# Patient Record
Sex: FEMALE | Race: BLACK OR AFRICAN AMERICAN | Employment: FULL TIME | URBAN - METROPOLITAN AREA
[De-identification: names, ages, dates, MRNs, and addresses within clinical notes are randomized per-mention and may not be internally consistent; named-entity substitution may affect disease eponyms.]

---

## 2017-01-23 ENCOUNTER — HOSPITAL ENCOUNTER (EMERGENCY)
Age: 37
Discharge: HOME | End: 2017-01-23
Attending: FAMILY MEDICINE

## 2017-01-23 VITALS
HEIGHT: 67 IN | SYSTOLIC BLOOD PRESSURE: 178 MMHG | TEMPERATURE: 100.6 F | BODY MASS INDEX: 44.23 KG/M2 | DIASTOLIC BLOOD PRESSURE: 79 MMHG | OXYGEN SATURATION: 99 % | WEIGHT: 281.8 LBS | RESPIRATION RATE: 18 BRPM | HEART RATE: 101 BPM

## 2017-01-23 DIAGNOSIS — J20.9 ACUTE BRONCHITIS, UNSPECIFIED ORGANISM: Primary | ICD-10-CM

## 2017-01-23 LAB
INFLUENZA A AG (POC): NEGATIVE
INFLUENZA AG (POC) NEGATIVE CTRL.: NEGATIVE
INFLUENZA AG (POC) POSITIVE CTRL.: POSITIVE
INFLUENZA B AG (POC): NEGATIVE
LOT NUMBER POC: NORMAL

## 2017-01-23 RX ORDER — AZITHROMYCIN 250 MG/1
TABLET, FILM COATED ORAL
Qty: 6 TAB | Refills: 0 | Status: SHIPPED | OUTPATIENT
Start: 2017-01-23 | End: 2017-02-18

## 2017-01-23 RX ORDER — METHYLPREDNISOLONE 4 MG/1
TABLET ORAL
Qty: 1 DOSE PACK | Refills: 0 | Status: SHIPPED | OUTPATIENT
Start: 2017-01-23 | End: 2017-02-18

## 2017-01-23 RX ORDER — BENZONATATE 100 MG/1
100 CAPSULE ORAL
Qty: 30 CAP | Refills: 0 | Status: SHIPPED | OUTPATIENT
Start: 2017-01-23 | End: 2017-01-30

## 2017-01-23 NOTE — UC PROVIDER NOTE
Patient is a 40 y.o. female presenting with cough, fever, ear pain, and sore throat. The history is provided by the patient. Cough   This is a new problem. The current episode started yesterday. The problem occurs constantly. The problem has been gradually worsening. The cough is non-productive. There has been no fever. Associated symptoms include ear pain and sore throat. Pertinent negatives include no shortness of breath, no wheezing, no nausea and no vomiting. Her past medical history is significant for asthma. Her past medical history does not include bronchitis, pneumonia, bronchiectasis, COPD, cancer, heart failure or CHF. Fever    Associated symptoms include sore throat and cough. Pertinent negatives include no vomiting and no shortness of breath. Ear Pain    Associated symptoms include sore throat and cough. Pertinent negatives include no vomiting. Sore Throat    Associated symptoms include ear pain and cough. Pertinent negatives include no vomiting, no shortness of breath and no stridor.         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    Hx orthopaedic           Family History   Problem Relation Age of Onset    Diabetes Mother     Diabetes Father     Diabetes Maternal Aunt     Diabetes Maternal Uncle     Diabetes Maternal Grandmother     Heart Disease Maternal Grandmother     Stroke Maternal Grandmother     Diabetes Maternal Grandfather     Heart Disease Maternal Grandfather     Stroke Maternal Grandfather     Diabetes Paternal Grandmother     Diabetes Paternal Grandfather         Social History     Social History    Marital status: SINGLE     Spouse name: N/A    Number of children: N/A    Years of education: N/A     Occupational History    Not on file. Social History Main Topics    Smoking status: Never Smoker    Smokeless tobacco: Never Used    Alcohol use No    Drug use: No    Sexual activity: Yes     Birth control/ protection: None     Other Topics Concern    Not on file     Social History Narrative    Lives in [place] alone. No kids. Works in [employer] at [employer]. Likes to [employer] and travel. ALLERGIES: Pcn [penicillins]    Review of Systems   Constitutional: Positive for fever. HENT: Positive for ear pain and sore throat. Respiratory: Positive for cough. Negative for shortness of breath, wheezing and stridor. Gastrointestinal: Negative for nausea and vomiting. Genitourinary: Negative for dysuria. Musculoskeletal: Negative for back pain. Vitals:    01/23/17 1718   BP: 178/79   Pulse: (!) 101   Resp: 18   Temp: (!) 100.6 °F (38.1 °C)   SpO2: 99%   Weight: 127.8 kg (281 lb 12.8 oz)   Height: 5' 7\" (1.702 m)       Physical Exam   Constitutional: She is oriented to person, place, and time. No distress. HENT:   Mouth/Throat: No oropharyngeal exudate. Eyes: Right eye exhibits no discharge. Left eye exhibits no discharge. No scleral icterus. Neck: No JVD present. No tracheal deviation present. No thyromegaly present. Cardiovascular: Regular rhythm. Exam reveals no gallop. No murmur heard. Pulmonary/Chest: No respiratory distress. She has wheezes. She has no rales. Abdominal: She exhibits no distension and no mass. There is no tenderness. There is no rebound and no guarding. Musculoskeletal: She exhibits no edema or tenderness. Neurological: She is alert and oriented to person, place, and time. No cranial nerve deficit. Coordination normal.   Skin: No rash noted. She is not diaphoretic. No erythema. No pallor. Psychiatric: She has a normal mood and affect.  Her behavior is normal. Judgment and thought content normal. MDM    Procedures

## 2017-01-23 NOTE — DISCHARGE INSTRUCTIONS
Bronchitis: Care Instructions  Your Care Instructions    Bronchitis is inflammation of the bronchial tubes, which carry air to the lungs. The tubes swell and produce mucus, or phlegm. The mucus and inflamed bronchial tubes make you cough. You may have trouble breathing. Most cases of bronchitis are caused by viruses like those that cause colds. Antibiotics usually do not help and they may be harmful. Bronchitis usually develops rapidly and lasts about 2 to 3 weeks in otherwise healthy people. Follow-up care is a key part of your treatment and safety. Be sure to make and go to all appointments, and call your doctor if you are having problems. It's also a good idea to know your test results and keep a list of the medicines you take. How can you care for yourself at home? · Take all medicines exactly as prescribed. Call your doctor if you think you are having a problem with your medicine. · Get some extra rest.  · Take an over-the-counter pain medicine, such as acetaminophen (Tylenol), ibuprofen (Advil, Motrin), or naproxen (Aleve) to reduce fever and relieve body aches. Read and follow all instructions on the label. · Do not take two or more pain medicines at the same time unless the doctor told you to. Many pain medicines have acetaminophen, which is Tylenol. Too much acetaminophen (Tylenol) can be harmful. · Take an over-the-counter cough medicine that contains dextromethorphan to help quiet a dry, hacking cough so that you can sleep. Avoid cough medicines that have more than one active ingredient. Read and follow all instructions on the label. · Breathe moist air from a humidifier, hot shower, or sink filled with hot water. The heat and moisture will thin mucus so you can cough it out. · Do not smoke. Smoking can make bronchitis worse. If you need help quitting, talk to your doctor about stop-smoking programs and medicines. These can increase your chances of quitting for good.   When should you call for help? Call 911 anytime you think you may need emergency care. For example, call if:  · You have severe trouble breathing. Call your doctor now or seek immediate medical care if:  · You have new or worse trouble breathing. · You cough up dark brown or bloody mucus (sputum). · You have a new or higher fever. · You have a new rash. Watch closely for changes in your health, and be sure to contact your doctor if:  · You cough more deeply or more often, especially if you notice more mucus or a change in the color of your mucus. · You are not getting better as expected. Where can you learn more? Go to http://kayce-nidia.info/. Enter H333 in the search box to learn more about \"Bronchitis: Care Instructions. \"  Current as of: May 23, 2016  Content Version: 11.1  © 3731-0678 [employer]. Care instructions adapted under license by [employer] (which disclaims liability or warranty for this information). If you have questions about a medical condition or this instruction, always ask your healthcare professional. [address] any warranty or liability for your use of this information. Prednisolone (By injection)   Prednisolone (pred-NIS-oh-lone)  Treats many diseases and conditions, especially problems related to inflammation. This medicine is a steroid. This product is no longer available in the [place] but may be available in other countries. Brand Name(s):Cotolone   There may be other brand names for this medicine. When This Medicine Should Not Be Used: This medicine is not right for everyone. Do not receive this medicine if you had an allergic reaction to prednisolone, or if you have a fungal infection. How to Use This Medicine:   Injectable  · A nurse or other health provider will give you this medicine. · The medicine may be injected into a vein, muscle, or joint.   Drugs and Foods to Avoid:   Ask your doctor or pharmacist before using any other medicine, including over-the-counter medicines, vitamins, and herbal products. · Some medicines and foods can affect how prednisolone works. Tell your doctor if you use any of the following:  ¨ Diabetes medicine  ¨ Phenobarbital  ¨ Phenytoin  ¨ Rifampin  ¨ Blood thinner, such as warfarin  ¨ Estrogen, including birth control pills or hormone replacement therapy  ¨ Diuretic (water pill)  · This medicine may interfere with vaccines. Ask your doctor before you get a flu shot or any other vaccines. Warnings While Using This Medicine:   · Tell your doctor if you are pregnant or breastfeeding or if you have kidney problems, liver disease, heart failure, high blood pressure, a recent heart attack, diabetes, glaucoma, osteoporosis, or thyroid problems. Tell your doctor about any infection you have. Also tell your doctor if you have had mental or emotional problems (such as depression). · This medicine may cause the following problems:  ¨ Mood or behavior changes  ¨ Higher blood pressure, retaining water, changes in salt or potassium levels in your body  ¨ Cataracts or glaucoma (with long-term use)  ¨ Weak bones or osteoporosis (with long-term use)  ¨ Slow growth in children (with long-term use)  ¨ Muscle problems (with high doses, especially if you have myasthenia gravis or similar nerve and muscle problems)  · This medicine could cause you to get infections more easily. Tell your doctor right away if you are exposed to chicken pox, measles, or other serious infection. Tell your doctor if you had a serious infection in the past, such as tuberculosis or herpes. · Do not stop using this medicine suddenly. Your doctor will need to slowly decrease your dose before you stop it completely. · Tell any doctor or dentist who treats you that you are using this medicine.   Possible Side Effects While Using This Medicine:   Call your doctor right away if you notice any of these side effects:  · Allergic reaction: Itching or hives, swelling in your face or hands, swelling or tingling in your mouth or throat, chest tightness, trouble breathing  · Dark freckles, skin color changes, coldness, weakness, tiredness, nausea, vomiting, weight loss  · Depression, trouble sleeping, unusual thoughts, feelings, or behaviors  · Dry mouth, increased thirst, muscle cramps, nausea or vomiting, uneven heartbeat  · Fever, chills, cough, sore throat, and body aches  · Muscle pain or weakness  · Rapid weight gain, swelling in your hands, ankles, or feet  · Severe stomach pain, nausea, vomiting, or red or black stools  · Trouble seeing, eye pain, headache  If you notice these less serious side effects, talk with your doctor:   · Increased appetite, weight gain  · Round, puffy face  · Weight gain around your neck, upper back, breast, face, or waist  If you notice other side effects that you think are caused by this medicine, tell your doctor. Call your doctor for medical advice about side effects. You may report side effects to FDA at 4-741-FDA-8301  © 2016 [address] is for End User's use only and may not be sold, redistributed or otherwise used for commercial purposes. The above information is an  only. It is not intended as medical advice for individual conditions or treatments. Talk to your doctor, nurse or pharmacist before following any medical regimen to see if it is safe and effective for you.

## 2017-02-18 ENCOUNTER — HOSPITAL ENCOUNTER (EMERGENCY)
Age: 37
Discharge: HOME | End: 2017-02-18
Attending: FAMILY MEDICINE

## 2017-02-18 VITALS
DIASTOLIC BLOOD PRESSURE: 82 MMHG | HEIGHT: 67 IN | RESPIRATION RATE: 18 BRPM | OXYGEN SATURATION: 98 % | SYSTOLIC BLOOD PRESSURE: 162 MMHG | TEMPERATURE: 98.3 F | HEART RATE: 83 BPM | BODY MASS INDEX: 45.04 KG/M2 | WEIGHT: 287 LBS

## 2017-02-18 DIAGNOSIS — H65.01 RIGHT ACUTE SEROUS OTITIS MEDIA, RECURRENCE NOT SPECIFIED: Primary | ICD-10-CM

## 2017-02-18 RX ORDER — PREDNISONE 20 MG/1
20 TABLET ORAL 2 TIMES DAILY
Qty: 10 TAB | Refills: 0 | Status: SHIPPED | OUTPATIENT
Start: 2017-02-18 | End: 2017-02-23

## 2017-02-18 RX ORDER — CEFDINIR 300 MG/1
300 CAPSULE ORAL 2 TIMES DAILY
Qty: 20 CAP | Refills: 0 | Status: SHIPPED | OUTPATIENT
Start: 2017-02-18

## 2017-02-18 RX ORDER — FLUTICASONE PROPIONATE 50 MCG
2 SPRAY, SUSPENSION (ML) NASAL DAILY
Qty: 1 BOTTLE | Refills: 0 | Status: SHIPPED | OUTPATIENT
Start: 2017-02-18

## 2017-02-18 NOTE — Clinical Note
Fluids/ gargles Claritin/ allegra Tylenol cold-sinus - max strength 1-2 tab 4 times/ day  
 with Advil as needed

## 2017-02-18 NOTE — UC PROVIDER NOTE
Patient is a 40 y.o. female presenting with ear pain. Ear Pain    This is a new problem. The current episode started more than 1 week ago. The problem occurs constantly. The problem has been gradually worsening. Patient complains that the right ear is affected. There has been no fever. Associated symptoms include headaches, hearing loss and sore throat. Pertinent negatives include no ear discharge and no neck pain. The risk factors include recent URI (recent flight 1 week before ). 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    Hx orthopaedic           Family History   Problem Relation Age of Onset    Diabetes Mother     Diabetes Father     Diabetes Maternal Aunt     Diabetes Maternal Uncle     Diabetes Maternal Grandmother     Heart Disease Maternal Grandmother     Stroke Maternal Grandmother     Diabetes Maternal Grandfather     Heart Disease Maternal Grandfather     Stroke Maternal Grandfather     Diabetes Paternal Grandmother     Diabetes Paternal Grandfather         Social History     Social History    Marital status: SINGLE     Spouse name: N/A    Number of children: N/A    Years of education: N/A     Occupational History    Not on file. Social History Main Topics    Smoking status: Never Smoker    Smokeless tobacco: Never Used    Alcohol use No    Drug use: No    Sexual activity: Yes     Birth control/ protection: None     Other Topics Concern    Not on file     Social History Narrative    Lives in [facility] alone. No kids. Works in [employer] at [employer]. Likes to [employer] and travel.                 ALLERGIES: Pcn [penicillins]    Review of Systems   HENT: Positive for ear pain, hearing loss and sore throat. Negative for ear discharge. Musculoskeletal: Negative for neck pain. Neurological: Positive for headaches. Vitals:    02/18/17 1545 02/18/17 1547   BP:  162/82   Pulse:  83   Resp:  18   Temp:  98.3 °F (36.8 °C)   SpO2:  98%   Weight: 130.2 kg (287 lb)    Height: 5' 7\" (1.702 m)        Physical Exam   Constitutional: No distress. HENT:   Right Ear: Ear canal normal. A middle ear effusion is present. Left Ear: Tympanic membrane and ear canal normal.   Nose: Nose normal.   Mouth/Throat: No oropharyngeal exudate, posterior oropharyngeal edema or posterior oropharyngeal erythema. Eyes: Conjunctivae are normal. Right eye exhibits no discharge. Left eye exhibits no discharge. Neck: Neck supple. Pulmonary/Chest: Effort normal and breath sounds normal. No respiratory distress. She has no wheezes. She has no rales. Lymphadenopathy:     She has no cervical adenopathy. Skin: No rash noted. Nursing note and vitals reviewed. MDM     Differential Diagnosis; Clinical Impression; Plan:     CLINICAL IMPRESSION:  Right acute serous otitis media, recurrence not specified  (primary encounter diagnosis)      DDX    Plan:    Omnicef/ flonase. Fluids/ gargles  Claritin/ allegra   Tylenol cold-sinus - max strength 1-2 tab 4 times/ day    with Advil as needed    If not better in 7 days - may use prednisone   Amount and/or Complexity of Data Reviewed:    Review and summarize past medical records:  Yes  Risk of Significant Complications, Morbidity, and/or Mortality:   Presenting problems: Moderate  Management options:   Moderate  Progress:   Patient progress:  Stable      Procedures

## 2017-02-18 NOTE — DISCHARGE INSTRUCTIONS
Middle Ear Fluid: Care Instructions  Your Care Instructions    Fluid often builds up inside the ear during a cold or allergies. Usually the fluid drains away, but sometimes a small tube in the ear, called the eustachian tube, stays blocked for months. Symptoms of fluid buildup may include:  · Popping, ringing, or a feeling of fullness or pressure in the ear. · Trouble hearing. · Balance problems and dizziness. In most cases, you can treat yourself at home. Follow-up care is a key part of your treatment and safety. Be sure to make and go to all appointments, and call your doctor if you are having problems. It's also a good idea to know your test results and keep a list of the medicines you take. How can you care for yourself at home? · In most cases, the fluid clears up within a few months without treatment. You may need more tests if the fluid does not clear up after 3 months. · If your doctor prescribed antibiotics, take them as directed. Do not stop taking them just because you feel better. You need to take the full course of antibiotics. When should you call for help? Watch closely for changes in your health, and be sure to contact your doctor if:  · You have pain or a fever. · You have any new symptoms, such as hearing problems. · You do not get better as expected. Where can you learn more? Go to http://kayce-nidia.info/. Enter K621 in the search box to learn more about \"Middle Ear Fluid: Care Instructions. \"  Current as of: July 29, 2016  Content Version: 11.1  © 1725-2527 [employer]. Care instructions adapted under license by [employer] (which disclaims liability or warranty for this information). If you have questions about a medical condition or this instruction, always ask your healthcare professional. [address] any warranty or liability for your use of this information.